# Patient Record
Sex: MALE | Race: OTHER
[De-identification: names, ages, dates, MRNs, and addresses within clinical notes are randomized per-mention and may not be internally consistent; named-entity substitution may affect disease eponyms.]

---

## 2018-12-03 LAB
25(OH)D3 SERPL-MCNC: 20.8 NG/ML
ALBUMIN SERPL ELPH-MCNC: 4.8 G/DL
ALP BLD-CCNC: 77 U/L
ALT SERPL-CCNC: 16 U/L
ANION GAP SERPL CALC-SCNC: 12 MMOL/L
APPEARANCE: CLEAR
AST SERPL-CCNC: 12 U/L
BACTERIA: NEGATIVE
BASOPHILS # BLD AUTO: 0.06 K/UL
BASOPHILS NFR BLD AUTO: 1.4 %
BILIRUB SERPL-MCNC: 0.9 MG/DL
BILIRUBIN URINE: NEGATIVE
BLOOD URINE: NEGATIVE
BUN SERPL-MCNC: 17 MG/DL
CALCIUM SERPL-MCNC: 9.8 MG/DL
CHLORIDE SERPL-SCNC: 104 MMOL/L
CHOLEST SERPL-MCNC: 200 MG/DL
CHOLEST/HDLC SERPL: 3 RATIO
CO2 SERPL-SCNC: 27 MMOL/L
COLOR: YELLOW
CREAT SERPL-MCNC: 0.95 MG/DL
EOSINOPHIL # BLD AUTO: 0.5 K/UL
EOSINOPHIL NFR BLD AUTO: 11.5 %
GLUCOSE QUALITATIVE U: NEGATIVE MG/DL
GLUCOSE SERPL-MCNC: 96 MG/DL
HCT VFR BLD CALC: 43 %
HDLC SERPL-MCNC: 66 MG/DL
HGB BLD-MCNC: 14 G/DL
IMM GRANULOCYTES NFR BLD AUTO: 0.2 %
KETONES URINE: NEGATIVE
LDLC SERPL CALC-MCNC: 124 MG/DL
LEUKOCYTE ESTERASE URINE: NEGATIVE
LYMPHOCYTES # BLD AUTO: 1.89 K/UL
LYMPHOCYTES NFR BLD AUTO: 43.3 %
MAN DIFF?: NORMAL
MCHC RBC-ENTMCNC: 30 PG
MCHC RBC-ENTMCNC: 32.6 GM/DL
MCV RBC AUTO: 92.3 FL
MICROSCOPIC-UA: NORMAL
MONOCYTES # BLD AUTO: 0.27 K/UL
MONOCYTES NFR BLD AUTO: 6.2 %
NEUTROPHILS # BLD AUTO: 1.63 K/UL
NEUTROPHILS NFR BLD AUTO: 37.4 %
NITRITE URINE: NEGATIVE
PH URINE: 6.5
PLATELET # BLD AUTO: 228 K/UL
POTASSIUM SERPL-SCNC: 4.5 MMOL/L
PROT SERPL-MCNC: 7.1 G/DL
PROTEIN URINE: NEGATIVE MG/DL
RBC # BLD: 4.66 M/UL
RBC # FLD: 12.2 %
RED BLOOD CELLS URINE: 0 /HPF
SODIUM SERPL-SCNC: 143 MMOL/L
SPECIFIC GRAVITY URINE: 1.01
SQUAMOUS EPITHELIAL CELLS: 0 /HPF
TRIGL SERPL-MCNC: 52 MG/DL
TSH SERPL-ACNC: 2.63 UIU/ML
UROBILINOGEN URINE: NEGATIVE MG/DL
WBC # FLD AUTO: 4.36 K/UL
WHITE BLOOD CELLS URINE: 0 /HPF

## 2018-12-04 ENCOUNTER — APPOINTMENT (OUTPATIENT)
Dept: FAMILY MEDICINE | Facility: CLINIC | Age: 23
End: 2018-12-04
Payer: MEDICAID

## 2018-12-04 VITALS
DIASTOLIC BLOOD PRESSURE: 55 MMHG | HEART RATE: 63 BPM | WEIGHT: 158 LBS | SYSTOLIC BLOOD PRESSURE: 132 MMHG | HEIGHT: 67 IN | OXYGEN SATURATION: 98 % | BODY MASS INDEX: 24.8 KG/M2 | TEMPERATURE: 97.9 F

## 2018-12-04 DIAGNOSIS — Z83.3 FAMILY HISTORY OF DIABETES MELLITUS: ICD-10-CM

## 2018-12-04 DIAGNOSIS — Z78.9 OTHER SPECIFIED HEALTH STATUS: ICD-10-CM

## 2018-12-04 PROCEDURE — G0008: CPT

## 2018-12-04 PROCEDURE — 90686 IIV4 VACC NO PRSV 0.5 ML IM: CPT

## 2018-12-04 PROCEDURE — 99385 PREV VISIT NEW AGE 18-39: CPT | Mod: 25

## 2018-12-04 NOTE — HEALTH RISK ASSESSMENT
[Good] : ~his/her~  mood as  good [No falls in past year] : Patient reported no falls in the past year [0] : 2) Feeling down, depressed, or hopeless: Not at all (0) [HIV test declined] : HIV test declined [Hepatitis C test declined] : Hepatitis C test declined [None] : None [With Family] : lives with family [# of Members in Household ___] :  household currently consist of [unfilled] member(s) [Employed] : employed [College] : College [Single] : single [# Of Children ___] : has [unfilled] children [Sexually Active] : sexually active [Feels Safe at Home] : Feels safe at home [Fully functional (bathing, dressing, toileting, transferring, walking, feeding)] : Fully functional (bathing, dressing, toileting, transferring, walking, feeding) [Fully functional (using the telephone, shopping, preparing meals, housekeeping, doing laundry, using] : Fully functional and needs no help or supervision to perform IADLs (using the telephone, shopping, preparing meals, housekeeping, doing laundry, using transportation, managing medications and managing finances) [Smoke Detector] : smoke detector [Carbon Monoxide Detector] : carbon monoxide detector [Seat Belt] :  uses seat belt [Sunscreen] : uses sunscreen [] : No [[id number]] : 0 [Change in mental status noted] : No change in mental status noted [Language] : denies difficulty with language [Behavior] : denies difficulty with behavior [Learning/Retaining New Information] : denies difficulty learning/retaining new information [Handling Complex Tasks] : denies difficulty handling complex tasks [Reasoning] : denies difficulty with reasoning [Spatial Ability and Orientation] : denies difficulty with spatial ability and orientation [High Risk Behavior] : no high risk behavior [Reports changes in hearing] : Reports no changes in hearing [Reports changes in vision] : Reports no changes in vision [Reports changes in dental health] : Reports no changes in dental health [Guns at Home] : no guns at home [Travel to Developing Areas] : does not  travel to developing areas [TB Exposure] : is not being exposed to tuberculosis [Caregiver Concerns] : does not have caregiver concerns [de-identified] : 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 [de-identified] : [employer]

## 2018-12-04 NOTE — HISTORY OF PRESENT ILLNESS
[FreeTextEntry1] : 'I need to get my physical exam " [de-identified] : This is a 23-year-old male with no significant past medical history presenting to the office to establish as a new patient in our practice and requested to have a complete physical exam. Patient offered no acute complaints at this time.\par In preparation to physical exam patient was given a prescription to get blood work done prior to visit.

## 2018-12-04 NOTE — ASSESSMENT
[FreeTextEntry1] : This is a 23-year-old male with no significant past medical history presenting to the office to establish as a new patient in our practice and requesting to have a complete physical exam. Patient has not had pediatrician or primary care physician since 2015..\par \par Health care maintenance:\par -Physical exam is entirely normal.\par -Patient received flu vaccine today.\par -Blood results were reviewed along with the patient.\par -Recommended to increase his vitamin D dietary intake as his vitamin D levels were low.\par -Diet and exercise were discussed with the patient.\par -Return to the office as needed.

## 2018-12-11 ENCOUNTER — APPOINTMENT (OUTPATIENT)
Dept: FAMILY MEDICINE | Facility: CLINIC | Age: 23
End: 2018-12-11

## 2020-02-12 ENCOUNTER — FORM ENCOUNTER (OUTPATIENT)
Age: 25
End: 2020-02-12

## 2020-02-13 ENCOUNTER — APPOINTMENT (OUTPATIENT)
Dept: FAMILY MEDICINE | Facility: CLINIC | Age: 25
End: 2020-02-13
Payer: COMMERCIAL

## 2020-02-13 ENCOUNTER — OUTPATIENT (OUTPATIENT)
Dept: OUTPATIENT SERVICES | Facility: HOSPITAL | Age: 25
LOS: 1 days | End: 2020-02-13
Payer: COMMERCIAL

## 2020-02-13 ENCOUNTER — APPOINTMENT (OUTPATIENT)
Dept: RADIOLOGY | Facility: CLINIC | Age: 25
End: 2020-02-13
Payer: COMMERCIAL

## 2020-02-13 VITALS
HEIGHT: 67 IN | DIASTOLIC BLOOD PRESSURE: 71 MMHG | BODY MASS INDEX: 26.68 KG/M2 | HEART RATE: 66 BPM | WEIGHT: 170 LBS | OXYGEN SATURATION: 96 % | TEMPERATURE: 98 F | SYSTOLIC BLOOD PRESSURE: 123 MMHG

## 2020-02-13 DIAGNOSIS — M25.311 OTHER INSTABILITY, RIGHT SHOULDER: ICD-10-CM

## 2020-02-13 PROCEDURE — 99395 PREV VISIT EST AGE 18-39: CPT | Mod: 25

## 2020-02-13 PROCEDURE — 90471 IMMUNIZATION ADMIN: CPT

## 2020-02-13 PROCEDURE — 73030 X-RAY EXAM OF SHOULDER: CPT

## 2020-02-13 PROCEDURE — 90686 IIV4 VACC NO PRSV 0.5 ML IM: CPT

## 2020-02-13 PROCEDURE — 73030 X-RAY EXAM OF SHOULDER: CPT | Mod: 26,RT

## 2020-02-13 NOTE — REVIEW OF SYSTEMS
[Negative] : Neurological [FreeTextEntry9] : "Able to crack Left shoulder and right hip for no reason"

## 2020-02-13 NOTE — HISTORY OF PRESENT ILLNESS
[FreeTextEntry1] : physical exam [de-identified] : This is a 24-year-old male with no significant past medical history presenting to the office to have a complete physical exam. Patient offered no acute complaints at this time. He has joined the [employer], stationed in [employer] [employer] [facility] [place].\par

## 2020-02-13 NOTE — ASSESSMENT
[FreeTextEntry1] : This is a 23-year-old male with no significant past medical history presenting to the office requesting to have a complete physical exam. \par \par Health care maintenance:\par -Physical exam is entirely normal.\par -Patient received flu vaccine today.\par -Blood work at outside facility since he is not fasting today.\par -Diet and exercise were discussed with the patient.\par -Return to the office as needed.\par -HIV Consented

## 2020-02-13 NOTE — HEALTH RISK ASSESSMENT
[Good] : ~his/her~  mood as  good [No falls in past year] : Patient reported no falls in the past year [No] : No [0] : 2) Feeling down, depressed, or hopeless: Not at all (0) [HIV Test offered] : HIV Test offered [Hepatitis C test declined] : Hepatitis C test declined [With Family] : lives with family [None] : None [# of Members in Household ___] :  household currently consist of [unfilled] member(s) [College] : College [Employed] : employed [# Of Children ___] : has [unfilled] children [Single] : single [Sexually Active] : sexually active [Fully functional (bathing, dressing, toileting, transferring, walking, feeding)] : Fully functional (bathing, dressing, toileting, transferring, walking, feeding) [Feels Safe at Home] : Feels safe at home [Fully functional (using the telephone, shopping, preparing meals, housekeeping, doing laundry, using] : Fully functional and needs no help or supervision to perform IADLs (using the telephone, shopping, preparing meals, housekeeping, doing laundry, using transportation, managing medications and managing finances) [Smoke Detector] : smoke detector [Seat Belt] :  uses seat belt [Carbon Monoxide Detector] : carbon monoxide detector [Sunscreen] : uses sunscreen [Patient/Caregiver not ready to engage] : Patient/Caregiver not ready to engage [[id number]] : 0 [] : No [Change in mental status noted] : No change in mental status noted [Learning/Retaining New Information] : denies difficulty learning/retaining new information [Behavior] : denies difficulty with behavior [Language] : denies difficulty with language [Handling Complex Tasks] : denies difficulty handling complex tasks [Spatial Ability and Orientation] : denies difficulty with spatial ability and orientation [Reasoning] : denies difficulty with reasoning [High Risk Behavior] : no high risk behavior [Reports changes in hearing] : Reports no changes in hearing [Reports changes in vision] : Reports no changes in vision [Travel to Developing Areas] : does not  travel to developing areas [Guns at Home] : no guns at home [Reports changes in dental health] : Reports no changes in dental health [TB Exposure] : is not being exposed to tuberculosis [Caregiver Concerns] : does not have caregiver concerns [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

## 2020-02-13 NOTE — PHYSICAL EXAM
[No Acute Distress] : no acute distress [Well Nourished] : well nourished [Well Developed] : well developed [Well-Appearing] : well-appearing [Normal Sclera/Conjunctiva] : normal sclera/conjunctiva [PERRL] : pupils equal round and reactive to light [EOMI] : extraocular movements intact [Normal Outer Ear/Nose] : the outer ears and nose were normal in appearance [Normal Oropharynx] : the oropharynx was normal [No JVD] : no jugular venous distention [No Lymphadenopathy] : no lymphadenopathy [Supple] : supple [Thyroid Normal, No Nodules] : the thyroid was normal and there were no nodules present [No Respiratory Distress] : no respiratory distress  [No Accessory Muscle Use] : no accessory muscle use [Clear to Auscultation] : lungs were clear to auscultation bilaterally [Normal Rate] : normal rate  [Regular Rhythm] : with a regular rhythm [Normal S1, S2] : normal S1 and S2 [No Murmur] : no murmur heard [No Carotid Bruits] : no carotid bruits [No Abdominal Bruit] : a ~M bruit was not heard ~T in the abdomen [No Varicosities] : no varicosities [Pedal Pulses Present] : the pedal pulses are present [No Edema] : there was no peripheral edema [No Palpable Aorta] : no palpable aorta [No Extremity Clubbing/Cyanosis] : no extremity clubbing/cyanosis [Soft] : abdomen soft [Non-distended] : non-distended [Non Tender] : non-tender [No Masses] : no abdominal mass palpated [No HSM] : no HSM [Normal Bowel Sounds] : normal bowel sounds [Normal Posterior Cervical Nodes] : no posterior cervical lymphadenopathy [Normal Anterior Cervical Nodes] : no anterior cervical lymphadenopathy [No CVA Tenderness] : no CVA  tenderness [No Joint Swelling] : no joint swelling [No Spinal Tenderness] : no spinal tenderness [Grossly Normal Strength/Tone] : grossly normal strength/tone [No Rash] : no rash [Coordination Grossly Intact] : coordination grossly intact [No Focal Deficits] : no focal deficits [Normal Gait] : normal gait [Deep Tendon Reflexes (DTR)] : deep tendon reflexes were 2+ and symmetric [Normal Affect] : the affect was normal [Normal Insight/Judgement] : insight and judgment were intact

## 2020-02-13 NOTE — COUNSELING
[Behavioral health counseling provided] : Behavioral health counseling provided [Engage in a relaxing activity] : Engage in a relaxing activity [AUDIT-C Screening administered and reviewed] : AUDIT-C Screening administered and reviewed [None] : None [Good understanding] : Patient has a good understanding of lifestyle changes and steps needed to achieve self management goal

## 2020-02-18 LAB
25(OH)D3 SERPL-MCNC: 17.6 NG/ML
ALBUMIN SERPL ELPH-MCNC: 4.6 G/DL
ALP BLD-CCNC: 62 U/L
ALT SERPL-CCNC: 19 U/L
ANION GAP SERPL CALC-SCNC: 13 MMOL/L
APPEARANCE: CLEAR
AST SERPL-CCNC: 22 U/L
BACTERIA: NEGATIVE
BASOPHILS # BLD AUTO: 0.05 K/UL
BASOPHILS NFR BLD AUTO: 0.9 %
BILIRUB SERPL-MCNC: 0.8 MG/DL
BILIRUBIN URINE: NEGATIVE
BLOOD URINE: NEGATIVE
BUN SERPL-MCNC: 25 MG/DL
CALCIUM SERPL-MCNC: 9.5 MG/DL
CHLORIDE SERPL-SCNC: 104 MMOL/L
CHOLEST SERPL-MCNC: 159 MG/DL
CHOLEST/HDLC SERPL: 3.4 RATIO
CO2 SERPL-SCNC: 24 MMOL/L
COLOR: NORMAL
CREAT SERPL-MCNC: 1.14 MG/DL
EOSINOPHIL # BLD AUTO: 0.17 K/UL
EOSINOPHIL NFR BLD AUTO: 2.9 %
GLUCOSE QUALITATIVE U: NEGATIVE
GLUCOSE SERPL-MCNC: 100 MG/DL
HCT VFR BLD CALC: 44.9 %
HDLC SERPL-MCNC: 47 MG/DL
HGB BLD-MCNC: 14.5 G/DL
HIV1+2 AB SPEC QL IA.RAPID: NONREACTIVE
HYALINE CASTS: 0 /LPF
IMM GRANULOCYTES NFR BLD AUTO: 0.2 %
KETONES URINE: NEGATIVE
LDLC SERPL CALC-MCNC: 94 MG/DL
LEUKOCYTE ESTERASE URINE: NEGATIVE
LYMPHOCYTES # BLD AUTO: 1.94 K/UL
LYMPHOCYTES NFR BLD AUTO: 33.5 %
MAN DIFF?: NORMAL
MCHC RBC-ENTMCNC: 30.5 PG
MCHC RBC-ENTMCNC: 32.3 GM/DL
MCV RBC AUTO: 94.5 FL
MICROSCOPIC-UA: NORMAL
MONOCYTES # BLD AUTO: 0.49 K/UL
MONOCYTES NFR BLD AUTO: 8.5 %
NEUTROPHILS # BLD AUTO: 3.13 K/UL
NEUTROPHILS NFR BLD AUTO: 54 %
NITRITE URINE: NEGATIVE
PH URINE: 6
PLATELET # BLD AUTO: 191 K/UL
POTASSIUM SERPL-SCNC: 4.4 MMOL/L
PROT SERPL-MCNC: 6.9 G/DL
PROTEIN URINE: NORMAL
RBC # BLD: 4.75 M/UL
RBC # FLD: 11.2 %
RED BLOOD CELLS URINE: 4 /HPF
SODIUM SERPL-SCNC: 141 MMOL/L
SPECIFIC GRAVITY URINE: 1.03
SQUAMOUS EPITHELIAL CELLS: 1 /HPF
TRIGL SERPL-MCNC: 88 MG/DL
TSH SERPL-ACNC: 2.65 UIU/ML
UROBILINOGEN URINE: NORMAL
WBC # FLD AUTO: 5.79 K/UL
WHITE BLOOD CELLS URINE: 0 /HPF

## 2020-05-26 ENCOUNTER — APPOINTMENT (OUTPATIENT)
Dept: FAMILY MEDICINE | Facility: CLINIC | Age: 25
End: 2020-05-26

## 2020-07-31 ENCOUNTER — EMERGENCY (EMERGENCY)
Facility: HOSPITAL | Age: 25
LOS: 1 days | Discharge: HOME | End: 2020-07-31
Attending: EMERGENCY MEDICINE
Payer: COMMERCIAL

## 2020-07-31 VITALS
OXYGEN SATURATION: 99 % | HEIGHT: 68 IN | HEART RATE: 66 BPM | WEIGHT: 169.98 LBS | DIASTOLIC BLOOD PRESSURE: 68 MMHG | RESPIRATION RATE: 18 BRPM | TEMPERATURE: 98 F | SYSTOLIC BLOOD PRESSURE: 137 MMHG

## 2020-07-31 PROCEDURE — 99283 EMERGENCY DEPT VISIT LOW MDM: CPT

## 2020-07-31 PROCEDURE — 73562 X-RAY EXAM OF KNEE 3: CPT

## 2020-07-31 PROCEDURE — 73562 X-RAY EXAM OF KNEE 3: CPT | Mod: 26,LT

## 2020-07-31 NOTE — ED PROVIDER NOTE - CARE PROVIDER_API CALL
Oliver Covarrubias  ORTHOPAEDIC SURGERY  [address] [place] [address]  Phone: [phone]  Fax: [phone]  Follow Up Time:

## 2020-07-31 NOTE — ED ADULT TRIAGE NOTE - CHIEF COMPLAINT QUOTE
Patient arrived to ED today with c/o left knee pain after playing soccer and twisting it 2 days ago.

## 2020-07-31 NOTE — ED PROVIDER NOTE - ATTENDING CONTRIBUTION TO CARE
23 yo male  c/o pain in left knee, s/p twisting while playing soccer x 2 days ago  no deformity  NV intact, 2+ pulses  swelling, and ttp medial knee  no ligamental laxity    xray  follow up

## 2020-07-31 NOTE — ED PROVIDER NOTE - PATIENT PORTAL LINK FT
You can access the FollowMyHealth Patient Portal offered by [facility] by registering at the following website: [email or web address]. By joining [employer]’s FollowMyHealth portal, you will also be able to view your health information using other applications (apps) compatible with our system.

## 2020-07-31 NOTE — ED PROVIDER NOTE - OBJECTIVE STATEMENT
25 y/o M c/o pain in left knee which started 2 days ago.  He states that he was playing soccer when he twisted his left knee, and fell, landing on the left knee.  Denies head trauma, neck pain, back pain or any other injuries.  Patient is ambulating with crutches.

## 2020-07-31 NOTE — ED ADULT NURSE NOTE - OBJECTIVE STATEMENT
A&Ox3, resp wnl, twisted left knee while playing soccer A&Ox3, resp wnl, twisted left knee while playing soccer,+ bruising to inner knee area, pt using crutches

## 2020-08-03 ENCOUNTER — APPOINTMENT (OUTPATIENT)
Age: 25
End: 2020-08-03
Payer: COMMERCIAL

## 2020-08-03 VITALS
BODY MASS INDEX: 26.68 KG/M2 | WEIGHT: 170 LBS | SYSTOLIC BLOOD PRESSURE: 117 MMHG | HEIGHT: 67 IN | DIASTOLIC BLOOD PRESSURE: 68 MMHG | HEART RATE: 70 BPM

## 2020-08-03 VITALS — TEMPERATURE: 98.4 F

## 2020-08-03 PROCEDURE — 99203 OFFICE O/P NEW LOW 30 MIN: CPT

## 2020-08-03 NOTE — DISCUSSION/SUMMARY
[de-identified] : Patient is 24 year old male who suffered a left knee injury playing soccer last week. His physical exam and pain are suggestive of an MCL injury however further ligamentous injury in the knee is possible. I am recommending he get an MRI of the left knee to further evaluate the soft tissue injury. He was placed in a short Lance brace today for medial to lateral stability and it was unlocked in flexion/extension. He was also given a prescription for physical therapy. He will call the office when the MRI is complete to discuss the results. Further treatment plan will be contingent on the results of the MRI. He was given opportunity to answer questions and acknowledged understanding of our discussion. \par \[facility] I, Panchito Rabago DO, have acted as a scribe and documented the above information for Dr. TURNER.\par \[facility] Orthopaedic Trauma Surgeon Addendum:\par \par I agree with the above resident physician note.  \par Chart was reviewed and patient examined in the orthopedic office. I agree with the assessment and plan of the resident.\par \par I was physically present for the key portions of the evaluation and management service provided. I agree with the above history, physical and plan. Appropriate imaging has been reviewed and the plan adjusted as needed.\par \par Oliver Turner MD\par Orthopaedic Trauma Surgeon\par [facility]\par [facility] [place]

## 2020-08-03 NOTE — HISTORY OF PRESENT ILLNESS
[Stable] : stable [___ days] : [unfilled] day(s) ago [3] : a current pain level of 3/10 [4] : an average pain level of 4/10 [0] : a minimum pain level of 0/10 [8] : a maximum pain level of 8/10 [Bending] : worsened by bending [Standing] : worsened by standing [Weight Bearing] : worsened by weight bearing [Ice] : relieved by ice [Rest] : relieved by rest [de-identified] : Patient is 24 year old male who had twisting injury Wednesday 7/29/23020 playing soccer. He works as a . He has had pain and swelling of the left knee since the injury and difficulty bearing weight. He was seen in the emergency department on Friday 7/31/2020 and had radiographs which were normal. He has been icing and elevating his leg since the injury with some relief. He has not taken medication for the pain. He is here for consultation. No other injuries. No pertinent medical or surgical history.  [de-identified] : sharp

## 2020-08-03 NOTE — PHYSICAL EXAM
[de-identified] : General: Patient is awake and alert and in no acute distress . oriented to person, place, and time. \par Skin: no rash, no induration and not torturous. \par Eyes: normal conjunctiva, normal eyelids and pupils were equal and round. \par ENT: normal ears, normal nose and normal gums.  \par Cardiovascular: positive peripheral pulses, brisk capillary refill, but no peripheral edema. \par Pulses were 2+ for bilateral dorsalis pedis and bilateral posterior tibial \par Respiratory: normal respiratory effort. \par Neurological: sensory intact in bilateral upper and lower extremities. \par \par \par \par Left Lower Extremity:\par +Effusion/Swelling of left knee, no skin lesions/abrasions\par Patient apprehensive on physical exam to passive range of motion. Able to Extend to 0 and flex to 100 degrees passively. Tenderness to palpation over medial knee, superficial MCL. No TTP laterally. Negative Anterior Drawer, Negative Posterior Drawer although patient was guarding during exam making true ligamentous examination difficult and not fully representative of true pathology\par \par Gait: Not bearing weight secondary to pain and crutches\par Sensation intact to light touch L2-S1\par 5/5 +EHL/FHL/TA/GS\par +DP/PT, brisk capillary refill in toes \par \par  [de-identified] : Radiographs from the emergency department reveal no fractures or dislocations, + Left Knee Effusion.

## 2020-08-03 NOTE — CONSULT LETTER
[Courtesy Letter:] : I had the pleasure of seeing your patient, [unfilled], in my office today. [Please see my note below.] : Please see my note below. [DrNino  ___] : Dr. APONTE

## 2020-08-08 ENCOUNTER — OUTPATIENT (OUTPATIENT)
Dept: OUTPATIENT SERVICES | Facility: HOSPITAL | Age: 25
LOS: 1 days | End: 2020-08-08

## 2020-08-08 ENCOUNTER — APPOINTMENT (OUTPATIENT)
Dept: MRI IMAGING | Facility: CLINIC | Age: 25
End: 2020-08-08
Payer: COMMERCIAL

## 2020-08-08 ENCOUNTER — APPOINTMENT (OUTPATIENT)
Dept: MRI IMAGING | Facility: CLINIC | Age: 25
End: 2020-08-08

## 2020-08-08 DIAGNOSIS — Z00.8 ENCOUNTER FOR OTHER GENERAL EXAMINATION: ICD-10-CM

## 2020-08-08 PROCEDURE — 73721 MRI JNT OF LWR EXTRE W/O DYE: CPT | Mod: 26,LT

## 2020-08-17 ENCOUNTER — APPOINTMENT (OUTPATIENT)
Dept: ORTHOPEDIC SURGERY | Facility: CLINIC | Age: 25
End: 2020-08-17
Payer: COMMERCIAL

## 2020-08-17 VITALS
HEART RATE: 65 BPM | BODY MASS INDEX: 25.76 KG/M2 | DIASTOLIC BLOOD PRESSURE: 72 MMHG | WEIGHT: 170 LBS | HEIGHT: 68 IN | SYSTOLIC BLOOD PRESSURE: 122 MMHG

## 2020-08-17 PROCEDURE — 99214 OFFICE O/P EST MOD 30 MIN: CPT

## 2020-08-17 RX ORDER — MELOXICAM 7.5 MG/1
7.5 TABLET ORAL
Qty: 21 | Refills: 0 | Status: COMPLETED | COMMUNITY
Start: 2020-08-17 | End: 2020-09-07

## 2020-08-17 NOTE — HISTORY OF PRESENT ILLNESS
[de-identified] : HOLLY is a 25 year old male who presents today for evaluation of left knee s/p injury 3 weeks ago while playing soccer. Her originally saw Dr Covarrubias for this issue who placed him in a brace and ordered an MRI. MRI reveals: High-grade tear of the ACL. Associated bone marrow edema/contusions in the posterior aspects of the medial and lateral tibial plateau and in the weightbearing aspect of the lateral femoral condyle. Heterogeneous increased signal and thickening of the medial collateral ligament with laxity of the medial collateral ligament, likely related to grade 2 sprain injury/partial tear.  Large knee joint effusion. Small popliteal cyst with likely rupture of the cyst. \par \par He admits to one PT session on Sunday for initial evaluation and he admits no not being able to fully extend his knee. He is not taking any medication for pain.\par

## 2020-08-17 NOTE — CONSULT LETTER
[Consult Letter:] : I had the pleasure of evaluating your patient, [unfilled]. [Please see my note below.] : Please see my note below. [Dear  ___] : Dear  [unfilled], [Sincerely,] : Sincerely, [Consult Closing:] : Thank you very much for allowing me to participate in the care of this patient.  If you have any questions, please do not hesitate to contact me. [FreeTextEntry3] : Pantera Vera DO.\par Sports Medicine \par \par Orthopaedic Surgery\par \par [facility] [place] [facility]\par \par [address] \par [address]\[place] [address]\par \par [address] [address] \[address] \[place] [address]\par \par Tel [phone]\par Fax [phone]\par \par  [FreeTextEntry2] : Chad Landis

## 2020-08-17 NOTE — DISCUSSION/SUMMARY
[de-identified] : HOLLY is a 25 year old male who presents today for evaluation of left knee s/p injury 3 weeks ago while playing soccer. Her originally saw Dr Covarrubias for this issue who placed him in a brace and ordered an MRI. MRI reveals: High-grade tear of the ACL. Associated bone marrow edema/contusions in the posterior aspects of the medial and lateral tibial plateau and in the weightbearing aspect of the lateral femoral condyle. Heterogeneous increased signal and thickening of the medial collateral ligament with laxity of the medial collateral ligament, likely related to grade 2 sprain injury/partial tear.  Large knee joint effusion. Small popliteal cyst with likely rupture of the cyst. \par \par He admits to one PT session on Sunday for initial evaluation and he admits no not being able to fully extend his knee. He is not taking any medication for pain. At this time based on his exam with swelling still apparent a script for Mobic x 21 days was sent to his pharmacy for him to take. He will continue PT 2x/week also to obtain full ROM and will work on extension exercises daily while at home.\par \par Given the nature of the injury, age and activity level of the patient, and risk for increased instability and potential for osteoarthritis, surgery is indicated. The patient understands that the common risks of an arthroscopic ACL reconstruction include infection, allergy to the anesthetic, re-rupture of the ligament and stiffness. If the range of motion does not progress properly, another arthroscopy and removal of scar may be necessary at 4-6 weeks. The patient accepts these risks. I had my surgical coordinator Milli meet with pt to go over dates to schedule this procedure. The pt understands continuation of PT until this procedure helps surgical outcomes as he is to continue doing this 2x/week until then. He agrees to the above plan and all questions were answered.

## 2020-08-17 NOTE — PHYSICAL EXAM
[de-identified] : Physical Exam:\par General: Well appearing, no acute distress\par Neurologic: A&Ox3, No focal deficits\par Head: NCAT without abrasions, lacerations, or ecchymosis to head, face, or scalp\par Eyes: No scleral icterus, no gross abnormalities\par Respiratory: Equal chest wall expansion bilaterally, no accessory muscle use\par Lymphatic: No lymphadenopathy palpated\par Skin: Warm and dry\par Psychiatric: Normal mood and affect\par \par Left knee\par \par Inspection/Palpation: Gait evaluation does reveal a limp. There is no inguinal adenopathy. Limb is well-perfused, without skin lesions, shows a grossly normal motor and sensory examination. \par ROM: The Left knee motion is significantly reduced and does cause significant pain. The knee exhibits a moderate effusion. \par Muscle/Nerves: Quad strength decreased secondary to injury. Motor exam 5/[ ]  distally, EHL/FHL/GSC/TA\par SILT L4-S1\par \par Special Tests: \par Joint line tenderness noted medial joint line at 0 and 90 degrees. \par Stable in varus and valgus stress at 0 and 30 degrees\par Negative posterior drawer. \par The knee has a 2A Lachman and positive anterior drawer.\par Unable to ellicit a pivot shift secondary to pain.  \par Normal hip and ankle exam. \par \par Right Knee:\par \par Inspection/Palpation: There is no inguinal adenopathy. Well perfused, without skin lesions, shows a grossly normal motor and sensory examination. \par ROM: Normal\par Muscle/Nerves: Quad strength decreased secondary to injury. Motor exam 5/5 distally, EHL/FHL/GSC/TA\par SILT L4-S1\par \par Special Tests: \par No Joint line tenderness noted  at medial and lateral joint line at 0 and 90 degrees. \par Stable in varus and valgus stress at 0 and 30 degrees\par Negative posterior drawer. \par Negative anterior drawer and stable Lachman \par Normal hip and ankle exam. [de-identified] : Left Knee MRI:\par \par High-grade tear of the ACL. Associated bone marrow edema/contusions in the posterior aspects of the medial and lateral tibial plateau and in the weightbearing aspect of the lateral femoral condyle. \par \par Heterogeneous increased signal and thickening of the medial collateral ligament with laxity of the medial collateral ligament, likely related to grade 2 sprain injury/partial tear. \par \par Large knee joint effusion. Small popliteal cyst with likely rupture of the cyst. \par \par

## 2020-09-17 ENCOUNTER — APPOINTMENT (OUTPATIENT)
Dept: ORTHOPEDIC SURGERY | Facility: AMBULATORY SURGERY CENTER | Age: 25
End: 2020-09-17
Payer: COMMERCIAL

## 2020-09-17 PROCEDURE — 29888 ARTHRS AID ACL RPR/AGMNTJ: CPT | Mod: LT

## 2020-09-17 PROCEDURE — 29881 ARTHRS KNE SRG MNISECTMY M/L: CPT | Mod: LT

## 2020-09-17 PROCEDURE — 20900 REMOVAL OF BONE FOR GRAFT: CPT

## 2020-09-17 RX ORDER — ASPIRIN 325 MG/1
325 TABLET ORAL
Qty: 28 | Refills: 0 | Status: COMPLETED | COMMUNITY
Start: 2020-09-17 | End: 2020-10-15

## 2020-09-17 RX ORDER — ONDANSETRON 4 MG/1
4 TABLET ORAL
Qty: 30 | Refills: 0 | Status: COMPLETED | COMMUNITY
Start: 2020-09-17 | End: 2020-09-22

## 2020-09-17 RX ORDER — OXYCODONE 5 MG/1
5 TABLET ORAL
Qty: 30 | Refills: 0 | Status: COMPLETED | COMMUNITY
Start: 2020-09-17 | End: 2020-09-24

## 2020-09-17 RX ORDER — ACETAMINOPHEN 500 MG/1
500 TABLET ORAL
Qty: 120 | Refills: 0 | Status: COMPLETED | COMMUNITY
Start: 2020-09-17 | End: 2020-10-07

## 2020-10-01 ENCOUNTER — APPOINTMENT (OUTPATIENT)
Dept: ORTHOPEDIC SURGERY | Facility: CLINIC | Age: 25
End: 2020-10-01
Payer: COMMERCIAL

## 2020-10-01 PROCEDURE — 73564 X-RAY EXAM KNEE 4 OR MORE: CPT | Mod: LT

## 2020-10-01 PROCEDURE — 99024 POSTOP FOLLOW-UP VISIT: CPT

## 2020-10-04 NOTE — HISTORY OF PRESENT ILLNESS
[___ Days Post Op] : post op day #[unfilled] [Clean/Dry/Intact] : clean, dry and intact [Healed] : healed [Swelling] : swollen [Neuro Intact] : an unremarkable neurological exam [Vascular Intact] : ~T peripheral vascular exam normal [Negative Paulina's] : maneuvers demonstrated a negative Paulina's sign [Xray (Date:___)] : [unfilled] Xray -  [Doing Well] : is doing well [Excellent Pain Control] : has excellent pain control [Chills] : no chills [Fever] : no fever [Nausea] : no nausea [Vomiting] : no vomiting [Erythema] : not erythematous [Discharge] : absent of discharge [Dehiscence] : not dehisced [de-identified] : S/P left knee ACLr BTB autograft, possible meniscus repair.  DOS: 9/17/2020. [de-identified] : HOLLY is a 25 year old male who presents today 14 days S/P left knee ACLr BTB autograft, possible meniscus repair.  He denies negative sequelae.  Patient presents in Lance brace locked in extension and NWB on crutches.  He performs PT 2x/week and HEP regularly at home. Patient took narcotic meds for the first week SPO but currently only takes ASPN.  [de-identified] : Left Knee\par \par The patient is A+Ox3, healthy appearing, and free of fever or chills. Examination reveals a well appearing surgical scar that is dry, clean and intact. There is no erythema but there is moderate effusion without warmth and mild ecchymosis throughout the leg. Knee motion is 0 - 90 degrees of passive ROM, straight leg raise with 4 degree extension lag and pain free. Weakened quad strength. Full sensation intact and dorsalis pedis pulses 2+.\par \par Straight leg raise with lag\par \par Special Tests: NEG Lachman, NEG anterior drawer, NEG posterior drawer with collateral testing and varus/valgus normal. [de-identified] : 2 views of the left knee were performed today and are available for me to review.  They were discussed with the patient and demonstrate adequate positioning of the suture buttons.  No fracture, no dislocation, no deformities.\par  [de-identified] : HOLLY is a 25 year old male who presents today 14 days S/P left knee ACLr BTB autograft, possible meniscus repair.  He denies negative sequelae.  Patient presents in Lance brace locked in extension and NWB on crutches.  He performs PT 2x/week and HEP regularly at home. Patient took narcotic meds for the first week SPO but currently only takes ASPN. \par \par Surgical sites are clean and dry without warmth or erythema, pt denies fever or chills. He is to continue PT for 4 more weeks until we see him again for re-assessment at the 2nd post-op appointment. May continue to use tylenol prn for pain. Due to lag on exam today he is to continue with her knee immobilizer in full extension with all weightbearing activities. He may unlock it up to 90 degrees with nonweightbearing activity and will progress with PT as they feel necessary. He agrees to the latter plan and does not have any further questions or concerns.

## 2020-10-07 ENCOUNTER — NON-APPOINTMENT (OUTPATIENT)
Age: 25
End: 2020-10-07

## 2020-11-02 ENCOUNTER — APPOINTMENT (OUTPATIENT)
Dept: ORTHOPEDIC SURGERY | Facility: CLINIC | Age: 25
End: 2020-11-02
Payer: COMMERCIAL

## 2020-11-02 PROCEDURE — 73560 X-RAY EXAM OF KNEE 1 OR 2: CPT | Mod: LT

## 2020-11-02 PROCEDURE — 99024 POSTOP FOLLOW-UP VISIT: CPT

## 2020-11-02 PROCEDURE — 99072 ADDL SUPL MATRL&STAF TM PHE: CPT

## 2020-12-09 ENCOUNTER — APPOINTMENT (OUTPATIENT)
Dept: ORTHOPEDIC SURGERY | Facility: CLINIC | Age: 25
End: 2020-12-09
Payer: COMMERCIAL

## 2020-12-09 PROCEDURE — 99024 POSTOP FOLLOW-UP VISIT: CPT

## 2020-12-09 NOTE — HISTORY OF PRESENT ILLNESS
[___ Weeks Post Op] : [unfilled] weeks post op [Clean/Dry/Intact] : clean, dry and intact [Healed] : healed [Swelling] : swollen [Neuro Intact] : an unremarkable neurological exam [Vascular Intact] : ~T peripheral vascular exam normal [Negative Paulina's] : maneuvers demonstrated a negative Paulina's sign [Doing Well] : is doing well [Excellent Pain Control] : has excellent pain control [Chills] : no chills [Fever] : no fever [Nausea] : no nausea [Vomiting] : no vomiting [Erythema] : not erythematous [Discharge] : absent of discharge [Dehiscence] : not dehisced [de-identified] : S/P left knee ACLr BTB autograft, possible meniscus repair.  DOS: 9/17/2020. [de-identified] : HOLLY is a 25 year old male who presents today 12 weeks S/P left knee ACLr BTB autograft, possible meniscus repair.  He denies negative sequelae. He denies knee pain at this time and is happy with his progress. He is looking to go back to work at this time and has been driving with no complaints. Patient presents today FWB without crutches or [place] brace.  He continues to perform physical therapy. He has not started running or cutting yet. He performs the bike but no elliptical.  [de-identified] : Left Knee\par \par The patient is A+Ox3, healthy appearing, and free of fever or chills. Examination reveals well appearing surgical scars that are dry, clean and intact. There is no erythema but there is no effusion without warmth and no ecchymosis throughout the leg. Knee motion is 0 - 140 degrees of passive ROM, straight leg raise without extension lag and pain free. Able to perform resisted leg raise. Mild weakness to quad strength relative to the contralateral side. Full sensation intact and dorsalis pedis pulses 2+.\par \par Special Tests: NEG Lachman, NEG anterior drawer, NEG posterior drawer with collateral testing and varus/valgus normal. [de-identified] : No imaging performed [de-identified] : HOLLY is a 25 year old male who presents today 12 weeks S/P left knee ACLr BTB autograft, possible meniscus repair.  He denies negative sequelae. He denies knee pain at this time and is happy with his progress. He is looking to go back to work at this time and has been driving with no complaints. Patient presents today FWB without crutches or [place] brace.  He continues to perform physical therapy.\par \par Surgical sites are clean and dry without warmth or erythema, pt denies fever or chills. He may continue WBAT at this time and progression with PT 2x/week alongside daily HEP. 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 He needs to work on agility training, plyometrics, and progress to running when able first. We will at this time have him switch to "[employer]" since he hasn't been getting the therapy he needs through his current location. We will see him back in 6 weeks for clinical reassessment. He agrees to the latter plan and does not have any further questions or concerns.

## 2020-12-14 NOTE — HISTORY OF PRESENT ILLNESS
[___ Weeks Post Op] : [unfilled] weeks post op [Clean/Dry/Intact] : clean, dry and intact [Healed] : healed [Swelling] : swollen [Neuro Intact] : an unremarkable neurological exam [Vascular Intact] : ~T peripheral vascular exam normal [Negative Paulina's] : maneuvers demonstrated a negative Paulina's sign [Xray (Date:___)] : [unfilled] Xray -  [Doing Well] : is doing well [Excellent Pain Control] : has excellent pain control [Chills] : no chills [Fever] : no fever [Nausea] : no nausea [Vomiting] : no vomiting [Erythema] : not erythematous [Discharge] : absent of discharge [Dehiscence] : not dehisced [de-identified] : S/P left knee ACLr BTB autograft, possible meniscus repair.  DOS: 9/17/2020. [de-identified] : HOLLY is a 25 year old male who presents today 6 weeks S/P left knee ACLr BTB autograft, possible meniscus repair.  He denies negative sequelae.  Patient presents in Lance brace locked in extension without assistive device.  He performs PT 2x/week and HEP regularly at home. Patient took narcotic meds for the first week SPO but currently denies taking medications.  The patient denies numbness/tingling to the extremity.  He denies pain.  Patient admits to muscular weakness but denies instability. [de-identified] : Left Knee\par \par The patient is A+Ox3, healthy appearing, and free of fever or chills. Examination reveals a well appearing surgical scar that is dry, clean and intact. There is no erythema but there is moderate effusion without warmth and mild ecchymosis throughout the leg. Knee motion is 0 - 120 degrees of passive ROM, straight leg raise with no degree extension lag and pain free. Weakened quad strength. Full sensation intact and dorsalis pedis pulses 2+.\par \par Straight leg raise with lag\par \par Special Tests: NEG Lachman, NEG anterior drawer, NEG posterior drawer with collateral testing and varus/valgus normal. [de-identified] : 2 views of the left knee were performed today and are available for me to review.  They were discussed with the patient and demonstrate adequate positioning of the suture buttons.  No fracture, no dislocation, no deformities.\par  [de-identified] : HOLLY is a 25 year old male who presents today 6 weeks S/P left knee ACLr BTB autograft, possible meniscus repair.  He denies negative sequelae.  Patient presents in Lance brace locked in extension and NWB on crutches.  He performs PT 2x/week and HEP regularly at home. Patient took narcotic meds for the first week SPO but currently only takes ASPN. \par \par Surgical sites are clean and dry without warmth or erythema, pt denies fever or chills. He is to continue PT for 6 more weeks until we see him again for re-assessment at the 3rd post-op appointment. May continue to use tylenol prn for pain. At this time he may wean off of his lance brace with his therapist and WBAT. He agrees to the latter plan and does not have any further questions or concerns.

## 2021-01-11 ENCOUNTER — APPOINTMENT (OUTPATIENT)
Dept: ORTHOPEDIC SURGERY | Facility: CLINIC | Age: 26
End: 2021-01-11
Payer: COMMERCIAL

## 2021-01-11 PROCEDURE — 99072 ADDL SUPL MATRL&STAF TM PHE: CPT

## 2021-01-11 PROCEDURE — 99214 OFFICE O/P EST MOD 30 MIN: CPT

## 2021-01-11 RX ORDER — NAPROXEN 500 MG/1
500 TABLET ORAL
Qty: 60 | Refills: 0 | Status: COMPLETED | COMMUNITY
Start: 2021-01-11 | End: 2021-02-10

## 2021-01-13 NOTE — HISTORY OF PRESENT ILLNESS
[___ mths] : [unfilled] month(s) ago [de-identified] : HOLLY is a 25 year old male who presents today 4 mo S/P left knee ACLr BTB autograft w/ partial lateral meniscectomy. He denies negative sequelae. He denies knee pain at this time and is happy with his progress. He is looking to go back to work at this time and has been driving with no complaints. He continues to perform physical therapy. Current symptoms include no chills, no fever, no nausea and no vomiting.\par \par Currently, the patient is doing well and is happy with his progress thus far.

## 2021-01-13 NOTE — REASON FOR VISIT
[Follow-Up Visit] : a follow-up visit for [Knee Injury] : knee injury [FreeTextEntry2] : 4 mo S/P L knee ACLr and partial lateral meniscectomy DOS 9/1720

## 2021-01-13 NOTE — PHYSICAL EXAM
[de-identified] : Physical Exam:\par General: Well appearing, no acute distress\par Neurologic: A&Ox3, No focal deficits\par Head: NCAT without abrasions, lacerations, or ecchymosis to head, face, or scalp\par Eyes: No scleral icterus, no gross abnormalities\par Respiratory: Equal chest wall expansion bilaterally, no accessory muscle use\par Lymphatic: No lymphadenopathy palpated\par Skin: Warm and dry\par Psychiatric: Normal mood and affect\par \par Left Knee\par \par The patient is A+Ox3, healthy appearing, and free of fever or chills. Examination reveals well appearing surgical scars that are dry, clean and intact. There is no erythema but there is no effusion without warmth and no ecchymosis throughout the leg. Knee motion is 0 - 140 degrees of passive ROM, straight leg raise without extension lag and pain free. Able to perform resisted leg raise. Mild weakness to quad strength relative to the contralateral side. Full sensation intact and dorsalis pedis pulses 2+.\par \par Special Tests: NEG Lachman, NEG anterior drawer, NEG posterior drawer with collateral testing and varus/valgus normal.

## 2021-01-13 NOTE — DISCUSSION/SUMMARY
[de-identified] : HOLLY is a 25 year old male who presents today 4 mo S/P left knee ACLr BTB autograft w/ partial lateral meniscectomy. He denies negative sequelae. He denies knee pain at this time and is happy with his progress. He is looking to go back to work at this time and has been driving with no complaints. He continues to perform physical therapy. Current symptoms include no chills, no fever, no nausea and no vomiting.\par \par Currently, the patient is doing well and is happy with his progress thus far.\par \par Surgical sites are clean and dry without warmth or erythema, pt denies fever or chills. He may continue WBAT at this time and progression with PT 2x/week alongside daily HEP. 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 He needs to work on agility training, plyometrics, and progress to running when able first. He has switched to [employer] since last visit and is happy with his progress since. We will see him back in 2 months for clinical reassessment. 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 I would like to see him back in 2 months to discuss potential advancement of job function at that time. He agrees to the latter plan and does not have any further questions or concerns.

## 2021-02-23 ENCOUNTER — APPOINTMENT (OUTPATIENT)
Dept: ORTHOPEDIC SURGERY | Facility: CLINIC | Age: 26
End: 2021-02-23
Payer: COMMERCIAL

## 2021-02-23 PROCEDURE — 99072 ADDL SUPL MATRL&STAF TM PHE: CPT

## 2021-02-23 PROCEDURE — 99214 OFFICE O/P EST MOD 30 MIN: CPT

## 2021-02-24 NOTE — PHYSICAL EXAM
[de-identified] : Physical Exam:\par General: Well appearing, no acute distress\par Neurologic: A&Ox3, No focal deficits\par Head: NCAT without abrasions, lacerations, or ecchymosis to head, face, or scalp\par Eyes: No scleral icterus, no gross abnormalities\par Respiratory: Equal chest wall expansion bilaterally, no accessory muscle use\par Lymphatic: No lymphadenopathy palpated\par Skin: Warm and dry\par Psychiatric: Normal mood and affect\par \par Left Knee\par \par The patient is A+Ox3, healthy appearing, and free of fever or chills. Examination reveals well appearing surgical scars that are dry, clean and intact. There is no erythema but there is no effusion without warmth and no ecchymosis throughout the leg. Knee motion is 0 - 140 degrees of passive ROM, straight leg raise without extension lag and pain free. Able to perform resisted leg raise. Mild weakness to quad strength relative to the contralateral side. Full sensation intact and dorsalis pedis pulses 2+.\par \par Special Tests: NEG Lachman, NEG anterior drawer, NEG posterior drawer with collateral testing and varus/valgus normal.

## 2021-02-24 NOTE — HISTORY OF PRESENT ILLNESS
[___ mths] : [unfilled] month(s) ago [de-identified] : HOLLY is a 25 year old male who presents today 6 mo S/P left knee ACLr BTB autograft w/ partial lateral meniscectomy. He denies negative sequelae. He denies knee pain at this time and is happy with his progress. He continues to perform physical therapy. Current symptoms include no chills, no fever, no nausea and no vomiting.\par \par Currently, the patient is doing well and is happy with his progress thus far. He has no other complaints at this time.

## 2021-02-24 NOTE — DISCUSSION/SUMMARY
[de-identified] : HOLLY is a 25 year old male who presents today 6 mo S/P left knee ACLr BTB autograft w/ partial lateral meniscectomy. He denies negative sequelae. He denies knee pain at this time and is happy with his progress. He continues to perform physical therapy. Current symptoms include no chills, no fever, no nausea and no vomiting.\par \par Currently, the patient is doing well and is happy with his progress thus far. He has no other complaints at this time. He at this time will continue formal PT 2x/week until we see him again alongside HEP daily. We will see him back in 2 months for clinical reassessment. I believe he is capable of returning to work full duty without restrictions starting March 1. He agrees with the above plan and all questions were answered.\par

## 2021-03-25 ENCOUNTER — APPOINTMENT (OUTPATIENT)
Dept: ORTHOPEDIC SURGERY | Facility: CLINIC | Age: 26
End: 2021-03-25

## 2021-09-10 ENCOUNTER — APPOINTMENT (OUTPATIENT)
Dept: ORTHOPEDIC SURGERY | Facility: CLINIC | Age: 26
End: 2021-09-10
Payer: COMMERCIAL

## 2021-09-10 PROCEDURE — 99213 OFFICE O/P EST LOW 20 MIN: CPT

## 2021-09-10 NOTE — HISTORY OF PRESENT ILLNESS
[de-identified] : HOLLY is a 26 year male who presents today for aftercare following sx to his left knee arthroscopy with ACL reconstruction BTB autograft, 1 year ago. Currently,

## 2021-09-10 NOTE — PHYSICAL EXAM
[de-identified] : Physical Exam:\par General: Well appearing, no acute distress\par Neurologic: A&Ox3, No focal deficits\par Head: NCAT without abrasions, lacerations, or ecchymosis to head, face, or scalp\par Eyes: No scleral icterus, no gross abnormalities\par Respiratory: Equal chest wall expansion bilaterally, no accessory muscle use\par Lymphatic: No lymphadenopathy palpated\par Skin: Warm and dry\par Psychiatric: Normal mood and affect\par \par Left Knee\par \par The patient is A+Ox3, healthy appearing, and free of fever or chills. Examination reveals well appearing surgical scars that are dry, clean and intact. There is no erythema but there is no effusion without warmth and no ecchymosis throughout the leg. Knee motion is 0 - 140 degrees of passive ROM, straight leg raise without extension lag and pain free. Able to perform resisted leg raise. Great quad strength without weakness. Full sensation intact and dorsalis pedis pulses 2+.\par \par Special Tests: NEG Lachman, NEG anterior drawer, NEG posterior drawer with collateral testing and varus/valgus normal.

## 2021-09-10 NOTE — DISCUSSION/SUMMARY
[de-identified] : HOLLY is a 25 year old male who presents today 1 year ago S/P left knee ACL reconstruction BTB autograft w/ partial lateral meniscectomy. He denies negative sequelae. He denies knee pain at this time and is happy with his progress. He continues to perform HEP and working out with a  since march, 2x/week. He stopped formal PT in March, prematurely at the 6 month roel and has not seen us for clinical evaluation since then. He did not perform pylometric or agility training or return to sports protocol with his therapist. He is back to work without complaints but reports difficulties with side to side activities/ lunges. He is very active playing soccer and working as a  in the city,\par \par Currently, the patient is doing well and is happy with his progress thus far but requires more formal PT for plyometrics and agility training. Due to his job description and activity level he also necessitates formal PT for completion of ACL return to sports criteria that was provided to him today. He will complete this packet with his therapist and return it to us at his follow up appointment. We will see him back in 2-3 months for clinical reassessment after performing more PT. He agrees with the above plan and all questions were answered.\par

## 2021-10-12 ENCOUNTER — APPOINTMENT (OUTPATIENT)
Dept: FAMILY MEDICINE | Facility: CLINIC | Age: 26
End: 2021-10-12
Payer: COMMERCIAL

## 2021-10-12 VITALS
DIASTOLIC BLOOD PRESSURE: 78 MMHG | TEMPERATURE: 98 F | OXYGEN SATURATION: 98 % | BODY MASS INDEX: 25.76 KG/M2 | HEART RATE: 72 BPM | WEIGHT: 170 LBS | HEIGHT: 68 IN | RESPIRATION RATE: 16 BRPM | SYSTOLIC BLOOD PRESSURE: 122 MMHG

## 2021-10-12 DIAGNOSIS — J30.2 OTHER SEASONAL ALLERGIC RHINITIS: ICD-10-CM

## 2021-10-12 PROCEDURE — 99212 OFFICE O/P EST SF 10 MIN: CPT

## 2021-10-12 NOTE — HEALTH RISK ASSESSMENT
[No] : In the past 12 months have you used drugs other than those required for medical reasons? No [No falls in past year] : Patient reported no falls in the past year [0] : 2) Feeling down, depressed, or hopeless: Not at all (0) [] : No [MFU1[place]] : 0

## 2021-10-12 NOTE — HISTORY OF PRESENT ILLNESS
[de-identified] : This is a 26-year-old male with no significant past medical history presenting to the office c/o dry cough that began productive of yellowish sputum x 1 week.  Pt has been taking OTC medication with no real relief. Denies any sick contacts at home or at work, he is fully vaccinated against Covid. Pt denies any fever, loss of taste or smell. [FreeTextEntry8] : This is a 26-year-old male with no significant past medical history presenting to the office c/o dry cough that began productive of yellowish sputum x 1 week.  Pt has been taking OTC medication with no real relief. Denies any sick contacts at home or at work, he is fully vaccinated against Covid. Pt denies any fever, loss of taste or smell.

## 2021-10-12 NOTE — PHYSICAL EXAM
[Normal] : no carotid or abdominal bruits heard, no varicosities, pedal pulses are present, no peripheral edema, no extremity clubbing or cyanosis and no palpable aorta [de-identified] : Boggy and pale nares, posterior pharynx pink and moist, no tonsillar enlargement.

## 2021-10-12 NOTE — ASSESSMENT
[FreeTextEntry1] : This is a 26-year-old male with no significant past medical history presenting to the office c/o dry cough and runny nose\par \par ENT: Seasonal allergies\par -Azelastine NS, Tessalon 200 mg caps q8h prn\par \par Health care maintenance:\par -Patient declines flu vaccine today.\par -Diet and exercise were discussed with the patient.\par -Return to the office as needed.\par -HIV non reactive\par -RTO for CPE at earliest convenience.

## 2021-12-06 ENCOUNTER — APPOINTMENT (OUTPATIENT)
Dept: ORTHOPEDIC SURGERY | Facility: CLINIC | Age: 26
End: 2021-12-06

## 2021-12-21 ENCOUNTER — APPOINTMENT (OUTPATIENT)
Dept: FAMILY MEDICINE | Facility: CLINIC | Age: 26
End: 2021-12-21
Payer: COMMERCIAL

## 2021-12-21 VITALS
SYSTOLIC BLOOD PRESSURE: 130 MMHG | RESPIRATION RATE: 16 BRPM | HEIGHT: 68 IN | TEMPERATURE: 97.2 F | HEART RATE: 80 BPM | DIASTOLIC BLOOD PRESSURE: 78 MMHG | OXYGEN SATURATION: 98 % | WEIGHT: 180 LBS | BODY MASS INDEX: 27.28 KG/M2

## 2021-12-21 PROCEDURE — 99395 PREV VISIT EST AGE 18-39: CPT | Mod: 25

## 2021-12-21 NOTE — HISTORY OF PRESENT ILLNESS
[FreeTextEntry8] : This is a 26-year-old male with no significant past medical history presenting to the office for CPE. Pt states that he tested + for Covid on 12/10 /21 after having been exposed to it (some  in his unit), then developed cough and loss of sense of smell. [FreeTextEntry1] : CPE [de-identified] : This is a 26-year-old male with no significant past medical history presenting to the office for CPE. Pt states that he tested + for Covid on 12/10 /21 after having been exposed to it (some  in his unit), then developed cough and loss of sense of smell.

## 2021-12-21 NOTE — COUNSELING
[Behavioral health counseling provided] : Behavioral health counseling provided [Engage in a relaxing activity] : Engage in a relaxing activity [AUDIT-C Screening administered and reviewed] : AUDIT-C Screening administered and reviewed [None] : None [Good understanding] : Patient has a good understanding of lifestyle changes and steps needed to achieve self management goal [Fall prevention counseling provided] : Fall prevention counseling provided [Adequate lighting] : Adequate lighting [No throw rugs] : No throw rugs [Use proper foot wear] : Use proper foot wear [Sleep ___ hours/day] : Sleep [unfilled] hours/day [Benefits of weight loss discussed] : Benefits of weight loss discussed [Encouraged to increase physical activity] : Encouraged to increase physical activity [____ min/wk Activity] : [unfilled] min/wk activity

## 2021-12-21 NOTE — ASSESSMENT
[FreeTextEntry1] : This is a 26-year-old male with PMHx of recent Covid 19 infection uncomplicated presents for CPE.\par \par ENT: Seasonal allergies\par -Continue Azelastine NS, Tessalon 200 mg caps q8h prn\par \par Health care maintenance:\par -Patient declines flu vaccine.\par -Covid vaccine MODERNA 1/27/21, 2/25/21\par -Diet and exercise were discussed with the patient.\par -Return to the office as needed.\par -HIV / Hep C non reactive\par

## 2021-12-21 NOTE — HEALTH RISK ASSESSMENT
[Good] : ~his/her~  mood as  good [No] : In the past 12 months have you used drugs other than those required for medical reasons? No [No falls in past year] : Patient reported no falls in the past year [0] : 2) Feeling down, depressed, or hopeless: Not at all (0) [Hepatitis C test declined] : Hepatitis C test declined [None] : None [With Family] : lives with family [# of Members in Household ___] :  household currently consist of [unfilled] member(s) [Employed] : employed [College] : College [Single] : single [# Of Children ___] : has [unfilled] children [Sexually Active] : sexually active [Feels Safe at Home] : Feels safe at home [Fully functional (bathing, dressing, toileting, transferring, walking, feeding)] : Fully functional (bathing, dressing, toileting, transferring, walking, feeding) [Fully functional (using the telephone, shopping, preparing meals, housekeeping, doing laundry, using] : Fully functional and needs no help or supervision to perform IADLs (using the telephone, shopping, preparing meals, housekeeping, doing laundry, using transportation, managing medications and managing finances) [Smoke Detector] : smoke detector [Carbon Monoxide Detector] : carbon monoxide detector [Seat Belt] :  uses seat belt [Sunscreen] : uses sunscreen [Former] : Former [Monthly or less (1 pt)] : Monthly or less (1 point) [PHQ-2 Negative - No further assessment needed] : PHQ-2 Negative - No further assessment needed [HIV test declined] : HIV test declined [Patient/Caregiver not ready to engage] : , patient/caregiver not ready to engage [Reviewed no changes] : Reviewed, no changes [Audit-CScore] : 1 [de-identified] : soccer, GYM [de-identified] : regular [[id number]] : 0 [Change in mental status noted] : No change in mental status noted [Language] : denies difficulty with language [Behavior] : denies difficulty with behavior [Learning/Retaining New Information] : denies difficulty learning/retaining new information [Handling Complex Tasks] : denies difficulty handling complex tasks [Reasoning] : denies difficulty with reasoning [Spatial Ability and Orientation] : denies difficulty with spatial ability and orientation [High Risk Behavior] : no high risk behavior [Reports changes in hearing] : Reports no changes in hearing [Reports changes in vision] : Reports no changes in vision [Reports changes in dental health] : Reports no changes in dental health [Guns at Home] : no guns at home [Travel to Developing Areas] : does not  travel to developing areas [TB Exposure] : is not being exposed to tuberculosis [Caregiver Concerns] : does not have caregiver concerns [HIVDate] : 2/20 [de-identified] : full time [FreeTextEntry2] :  [AdvancecareDate] : 12/21

## 2021-12-29 LAB
25(OH)D3 SERPL-MCNC: 31.3 NG/ML
ALBUMIN SERPL ELPH-MCNC: 5 G/DL
ALP BLD-CCNC: 75 U/L
ALT SERPL-CCNC: 60 U/L
ANION GAP SERPL CALC-SCNC: 14 MMOL/L
APPEARANCE: CLEAR
AST SERPL-CCNC: 30 U/L
BACTERIA: NEGATIVE
BASOPHILS # BLD AUTO: 0.07 K/UL
BASOPHILS NFR BLD AUTO: 0.7 %
BILIRUB SERPL-MCNC: 1 MG/DL
BILIRUBIN URINE: NEGATIVE
BLOOD URINE: NEGATIVE
BUN SERPL-MCNC: 12 MG/DL
CALCIUM SERPL-MCNC: 10.3 MG/DL
CHLORIDE SERPL-SCNC: 98 MMOL/L
CHOLEST SERPL-MCNC: 255 MG/DL
CO2 SERPL-SCNC: 28 MMOL/L
COLOR: YELLOW
CREAT SERPL-MCNC: 0.97 MG/DL
EOSINOPHIL # BLD AUTO: 0.17 K/UL
EOSINOPHIL NFR BLD AUTO: 1.7 %
GLUCOSE QUALITATIVE U: NEGATIVE
GLUCOSE SERPL-MCNC: 76 MG/DL
HCT VFR BLD CALC: 45.1 %
HDLC SERPL-MCNC: 48 MG/DL
HGB BLD-MCNC: 14.7 G/DL
HYALINE CASTS: 0 /LPF
IMM GRANULOCYTES NFR BLD AUTO: 1.7 %
KETONES URINE: NEGATIVE
LDLC SERPL CALC-MCNC: NORMAL MG/DL
LEUKOCYTE ESTERASE URINE: NEGATIVE
LYMPHOCYTES # BLD AUTO: 2.2 K/UL
LYMPHOCYTES NFR BLD AUTO: 22.5 %
MAN DIFF?: NORMAL
MCHC RBC-ENTMCNC: 30.4 PG
MCHC RBC-ENTMCNC: 32.6 GM/DL
MCV RBC AUTO: 93.2 FL
MICROSCOPIC-UA: NORMAL
MONOCYTES # BLD AUTO: 0.58 K/UL
MONOCYTES NFR BLD AUTO: 5.9 %
NEUTROPHILS # BLD AUTO: 6.59 K/UL
NEUTROPHILS NFR BLD AUTO: 67.5 %
NITRITE URINE: NEGATIVE
NONHDLC SERPL-MCNC: 206 MG/DL
PH URINE: 8
PLATELET # BLD AUTO: 259 K/UL
POTASSIUM SERPL-SCNC: 4.8 MMOL/L
PROT SERPL-MCNC: 7.8 G/DL
PROTEIN URINE: NORMAL
RBC # BLD: 4.84 M/UL
RBC # FLD: 11.1 %
RED BLOOD CELLS URINE: 0 /HPF
SODIUM SERPL-SCNC: 140 MMOL/L
SPECIFIC GRAVITY URINE: 1.02
SQUAMOUS EPITHELIAL CELLS: 1 /HPF
TRIGL SERPL-MCNC: 458 MG/DL
TSH SERPL-ACNC: 1.95 UIU/ML
UROBILINOGEN URINE: NORMAL
WBC # FLD AUTO: 9.78 K/UL
WHITE BLOOD CELLS URINE: 0 /HPF

## 2022-01-03 ENCOUNTER — NON-APPOINTMENT (OUTPATIENT)
Age: 27
End: 2022-01-03

## 2022-01-19 ENCOUNTER — APPOINTMENT (OUTPATIENT)
Dept: FAMILY MEDICINE | Facility: CLINIC | Age: 27
End: 2022-01-19
Payer: COMMERCIAL

## 2022-01-19 VITALS
HEART RATE: 86 BPM | OXYGEN SATURATION: 98 % | RESPIRATION RATE: 16 BRPM | WEIGHT: 173 LBS | BODY MASS INDEX: 26.22 KG/M2 | HEIGHT: 68 IN | TEMPERATURE: 98.3 F | SYSTOLIC BLOOD PRESSURE: 124 MMHG | DIASTOLIC BLOOD PRESSURE: 78 MMHG

## 2022-01-19 DIAGNOSIS — E78.2 MIXED HYPERLIPIDEMIA: ICD-10-CM

## 2022-01-19 PROCEDURE — 99212 OFFICE O/P EST SF 10 MIN: CPT | Mod: 25

## 2022-01-28 NOTE — HEALTH RISK ASSESSMENT
[Former] : Former [Monthly or less (1 pt)] : Monthly or less (1 point) [No] : In the past 12 months have you used drugs other than those required for medical reasons? No [No falls in past year] : Patient reported no falls in the past year [0] : 2) Feeling down, depressed, or hopeless: Not at all (0) [PHQ-2 Negative - No further assessment needed] : PHQ-2 Negative - No further assessment needed [Audit-CScore] : 1 [de-identified] : soccer, GYM [de-identified] : regular [BEQ9Clfnk] : 0 [With Patient/Caregiver] : , with patient/caregiver [Reviewed no changes] : Reviewed, no changes [AdvancecareDate] : 01/22

## 2022-01-28 NOTE — HISTORY OF PRESENT ILLNESS
[FreeTextEntry1] : blood work [de-identified] : This is a 26-year-old male with PMHx of HLD, uncomplicated Covid infection presenting to the office for follow up on lipid panel. Pt states that he has improved his diet, offers no complains

## 2022-01-28 NOTE — ASSESSMENT
[FreeTextEntry1] : This is a 26-year-old male with PMHx of recent Covid 19 infection uncomplicated, HLD presents for lipids check.\par \par CVS: HLD\par -Check fasting lipids today in house\par -Diet / exercise discussed.\par \par Health care maintenance:\par -Patient declines flu vaccine.\par -Covid vaccine MODERNA 1/27/21, 2/25/21\par -Diet and exercise were discussed with the patient.\par -Return to the office as needed.\par -HIV / Hep C non reactive\par

## 2022-02-04 LAB
CHOLEST SERPL-MCNC: 257 MG/DL
HDLC SERPL-MCNC: 65 MG/DL
LDLC SERPL CALC-MCNC: 180 MG/DL
NONHDLC SERPL-MCNC: 193 MG/DL
TRIGL SERPL-MCNC: 64 MG/DL

## 2023-04-13 ENCOUNTER — APPOINTMENT (OUTPATIENT)
Dept: FAMILY MEDICINE | Facility: CLINIC | Age: 28
End: 2023-04-13
Payer: COMMERCIAL

## 2023-04-13 VITALS
OXYGEN SATURATION: 98 % | WEIGHT: 180 LBS | SYSTOLIC BLOOD PRESSURE: 110 MMHG | DIASTOLIC BLOOD PRESSURE: 72 MMHG | HEART RATE: 62 BPM | HEIGHT: 68 IN | RESPIRATION RATE: 12 BRPM | BODY MASS INDEX: 27.28 KG/M2

## 2023-04-13 DIAGNOSIS — S83.512A SPRAIN OF ANTERIOR CRUCIATE LIGAMENT OF LEFT KNEE, INITIAL ENCOUNTER: ICD-10-CM

## 2023-04-13 DIAGNOSIS — Z92.29 PERSONAL HISTORY OF OTHER DRUG THERAPY: ICD-10-CM

## 2023-04-13 DIAGNOSIS — M54.9 DORSALGIA, UNSPECIFIED: ICD-10-CM

## 2023-04-13 DIAGNOSIS — M25.311 OTHER INSTABILITY, RIGHT SHOULDER: ICD-10-CM

## 2023-04-13 DIAGNOSIS — Z00.00 ENCOUNTER FOR GENERAL ADULT MEDICAL EXAMINATION W/OUT ABNORMAL FINDINGS: ICD-10-CM

## 2023-04-13 DIAGNOSIS — S83.412A SPRAIN OF MEDIAL COLLATERAL LIGAMENT OF LEFT KNEE, INITIAL ENCOUNTER: ICD-10-CM

## 2023-04-13 DIAGNOSIS — Z98.890 OTHER SPECIFIED POSTPROCEDURAL STATES: ICD-10-CM

## 2023-04-13 DIAGNOSIS — S89.92XA UNSPECIFIED INJURY OF LEFT LOWER LEG, INITIAL ENCOUNTER: ICD-10-CM

## 2023-04-13 PROCEDURE — 36415 COLL VENOUS BLD VENIPUNCTURE: CPT

## 2023-04-13 PROCEDURE — 99395 PREV VISIT EST AGE 18-39: CPT | Mod: 25

## 2023-04-13 PROCEDURE — G0442 ANNUAL ALCOHOL SCREEN 15 MIN: CPT

## 2023-04-13 RX ORDER — NAPROXEN 500 MG/1
500 TABLET ORAL
Qty: 28 | Refills: 0 | Status: COMPLETED | COMMUNITY
Start: 2020-11-02 | End: 2023-04-13

## 2023-04-13 RX ORDER — BENZONATATE 200 MG/1
200 CAPSULE ORAL 3 TIMES DAILY
Qty: 30 | Refills: 1 | Status: COMPLETED | COMMUNITY
Start: 2021-10-12 | End: 2023-04-13

## 2023-04-13 RX ORDER — AZELASTINE HYDROCHLORIDE 137 UG/1
137 SPRAY, METERED NASAL
Qty: 3 | Refills: 1 | Status: COMPLETED | COMMUNITY
Start: 2021-10-12 | End: 2023-04-13

## 2023-04-13 NOTE — HISTORY OF PRESENT ILLNESS
[FreeTextEntry1] : CPE [de-identified] : This is a 26-year-old male with PMHx of HLD, uncomplicated Covid infection presenting to the office for follow up on lipid panel. Pt states that he has improved his diet, offers no complains

## 2023-04-13 NOTE — ASSESSMENT
[FreeTextEntry1] : This is a 27-year-old male with PMHx of Covid 19 infection, HLD presents for CPE\par \par CVS: HLD\par -Check fasting lipids today in house\par -Diet / exercise discussed.\par \par ENT: Decreased sense of smell\par -Veramyst NS e-prescribed.\par \par Health care maintenance:\par -Patient declines flu vaccine.\par -Covid vaccine MODERNA 1/27/21, 2/25/21\par -Diet and exercise were discussed with the patient.\par -Return to the office as needed.\par -HIV / Hep C non reactive\par

## 2023-04-13 NOTE — PHYSICAL EXAM
[No Acute Distress] : no acute distress [Well Nourished] : well nourished [Well Developed] : well developed [Well-Appearing] : well-appearing [Normal Sclera/Conjunctiva] : normal sclera/conjunctiva [PERRL] : pupils equal round and reactive to light [EOMI] : extraocular movements intact [Normal Outer Ear/Nose] : the outer ears and nose were normal in appearance [Normal Oropharynx] : the oropharynx was normal [No JVD] : no jugular venous distention [No Lymphadenopathy] : no lymphadenopathy [Supple] : supple [Thyroid Normal, No Nodules] : the thyroid was normal and there were no nodules present [No Respiratory Distress] : no respiratory distress  [No Accessory Muscle Use] : no accessory muscle use [Clear to Auscultation] : lungs were clear to auscultation bilaterally [Normal Rate] : normal rate  [Regular Rhythm] : with a regular rhythm [Normal S1, S2] : normal S1 and S2 [No Murmur] : no murmur heard [No Carotid Bruits] : no carotid bruits [No Abdominal Bruit] : a ~M bruit was not heard ~T in the abdomen [No Varicosities] : no varicosities [Pedal Pulses Present] : the pedal pulses are present [No Edema] : there was no peripheral edema [No Palpable Aorta] : no palpable aorta [No Extremity Clubbing/Cyanosis] : no extremity clubbing/cyanosis [Soft] : abdomen soft [Non Tender] : non-tender [Non-distended] : non-distended [No Masses] : no abdominal mass palpated [No HSM] : no HSM [Normal Bowel Sounds] : normal bowel sounds [Normal Posterior Cervical Nodes] : no posterior cervical lymphadenopathy [Normal Anterior Cervical Nodes] : no anterior cervical lymphadenopathy [No CVA Tenderness] : no CVA  tenderness [No Spinal Tenderness] : no spinal tenderness [No Joint Swelling] : no joint swelling [Grossly Normal Strength/Tone] : grossly normal strength/tone [No Rash] : no rash [Coordination Grossly Intact] : coordination grossly intact [No Focal Deficits] : no focal deficits [Normal Gait] : normal gait [Deep Tendon Reflexes (DTR)] : deep tendon reflexes were 2+ and symmetric [Normal Affect] : the affect was normal [Normal Insight/Judgement] : insight and judgment were intact [de-identified] : decreased sense of smell since Covid

## 2023-04-13 NOTE — HEALTH RISK ASSESSMENT
[Good] : ~his/her~  mood as  good [Monthly or less (1 pt)] : Monthly or less (1 point) [No] : In the past 12 months have you used drugs other than those required for medical reasons? No [No falls in past year] : Patient reported no falls in the past year [0] : 2) Feeling down, depressed, or hopeless: Not at all (0) [PHQ-2 Negative - No further assessment needed] : PHQ-2 Negative - No further assessment needed [HIV test declined] : HIV test declined [Hepatitis C test declined] : Hepatitis C test declined [None] : None [With Family] : lives with family [# of Members in Household ___] :  household currently consist of [unfilled] member(s) [Employed] : employed [College] : College [Single] : single [# Of Children ___] : has [unfilled] children [Sexually Active] : sexually active [Feels Safe at Home] : Feels safe at home [Fully functional (bathing, dressing, toileting, transferring, walking, feeding)] : Fully functional (bathing, dressing, toileting, transferring, walking, feeding) [Fully functional (using the telephone, shopping, preparing meals, housekeeping, doing laundry, using] : Fully functional and needs no help or supervision to perform IADLs (using the telephone, shopping, preparing meals, housekeeping, doing laundry, using transportation, managing medications and managing finances) [Smoke Detector] : smoke detector [Carbon Monoxide Detector] : carbon monoxide detector [Seat Belt] :  uses seat belt [Sunscreen] : uses sunscreen [With Patient/Caregiver] : , with patient/caregiver [Yes] : Yes [Reviewed updated] : Reviewed, updated [Aggressive treatment] : aggressive treatment [Never] : Never [Audit-CScore] : 1 [de-identified] : soccer, GYM [de-identified] : regular [[id number]] : 0 [Change in mental status noted] : No change in mental status noted [Language] : denies difficulty with language [Behavior] : denies difficulty with behavior [Learning/Retaining New Information] : denies difficulty learning/retaining new information [Handling Complex Tasks] : denies difficulty handling complex tasks [Reasoning] : denies difficulty with reasoning [Spatial Ability and Orientation] : denies difficulty with spatial ability and orientation [High Risk Behavior] : no high risk behavior [Reports changes in hearing] : Reports no changes in hearing [Reports changes in vision] : Reports no changes in vision [Reports changes in dental health] : Reports no changes in dental health [Guns at Home] : no guns at home [Travel to Developing Areas] : does not  travel to developing areas [TB Exposure] : is not being exposed to tuberculosis [Caregiver Concerns] : does not have caregiver concerns [HIVDate] : 2/20 [de-identified] : full time [FreeTextEntry2] : [place]  [AdvancecareDate] : 04/23

## 2023-04-21 LAB
ALBUMIN SERPL ELPH-MCNC: 4.8 G/DL
ALP BLD-CCNC: 66 U/L
ALT SERPL-CCNC: 21 U/L
ANION GAP SERPL CALC-SCNC: 12 MMOL/L
APPEARANCE: CLEAR
AST SERPL-CCNC: 25 U/L
BACTERIA: NEGATIVE
BASOPHILS # BLD AUTO: 0.07 K/UL
BASOPHILS NFR BLD AUTO: 1.8 %
BILIRUB SERPL-MCNC: 1.2 MG/DL
BILIRUBIN URINE: NEGATIVE
BLOOD URINE: NEGATIVE
BUN SERPL-MCNC: 21 MG/DL
CALCIUM SERPL-MCNC: 10 MG/DL
CHLORIDE SERPL-SCNC: 102 MMOL/L
CHOLEST SERPL-MCNC: 207 MG/DL
CO2 SERPL-SCNC: 26 MMOL/L
COLOR: YELLOW
CREAT SERPL-MCNC: 1.08 MG/DL
EGFR: 98 ML/MIN/1.73M2
EOSINOPHIL # BLD AUTO: 0.21 K/UL
EOSINOPHIL NFR BLD AUTO: 5.4 %
GLUCOSE QUALITATIVE U: NEGATIVE
GLUCOSE SERPL-MCNC: 106 MG/DL
HCT VFR BLD CALC: 45.1 %
HDLC SERPL-MCNC: 55 MG/DL
HGB BLD-MCNC: 14.5 G/DL
HYALINE CASTS: 0 /LPF
IMM GRANULOCYTES NFR BLD AUTO: 0.3 %
KETONES URINE: NEGATIVE
LDLC SERPL CALC-MCNC: 139 MG/DL
LEUKOCYTE ESTERASE URINE: NEGATIVE
LYMPHOCYTES # BLD AUTO: 2.01 K/UL
LYMPHOCYTES NFR BLD AUTO: 51.5 %
MAN DIFF?: NORMAL
MCHC RBC-ENTMCNC: 31.4 PG
MCHC RBC-ENTMCNC: 32.2 GM/DL
MCV RBC AUTO: 97.6 FL
MICROSCOPIC-UA: NORMAL
MONOCYTES # BLD AUTO: 0.24 K/UL
MONOCYTES NFR BLD AUTO: 6.2 %
NEUTROPHILS # BLD AUTO: 1.36 K/UL
NEUTROPHILS NFR BLD AUTO: 34.8 %
NITRITE URINE: NEGATIVE
NONHDLC SERPL-MCNC: 152 MG/DL
PH URINE: 6.5
PLATELET # BLD AUTO: 201 K/UL
POTASSIUM SERPL-SCNC: 5.2 MMOL/L
PROT SERPL-MCNC: 7 G/DL
PROTEIN URINE: NORMAL
RBC # BLD: 4.62 M/UL
RBC # FLD: 11.4 %
RED BLOOD CELLS URINE: 2 /HPF
SODIUM SERPL-SCNC: 140 MMOL/L
SPECIFIC GRAVITY URINE: 1.03
SQUAMOUS EPITHELIAL CELLS: 0 /HPF
TRIGL SERPL-MCNC: 63 MG/DL
TSH SERPL-ACNC: 1.72 UIU/ML
UROBILINOGEN URINE: NORMAL
WBC # FLD AUTO: 3.9 K/UL
WHITE BLOOD CELLS URINE: 0 /HPF

## 2023-04-21 RX ORDER — FLUTICASONE FUROATE 27.5 UG/1
27.5 SPRAY, METERED NASAL DAILY
Qty: 1 | Refills: 0 | Status: ACTIVE | COMMUNITY
Start: 2023-04-13 | End: 1900-01-01

## 2024-10-26 ENCOUNTER — NON-APPOINTMENT (OUTPATIENT)
Age: 29
End: 2024-10-26

## 2024-10-26 ENCOUNTER — RESULT REVIEW (OUTPATIENT)
Age: 29
End: 2024-10-26

## 2024-10-26 ENCOUNTER — TRANSCRIPTION ENCOUNTER (OUTPATIENT)
Age: 29
End: 2024-10-26

## 2024-10-30 ENCOUNTER — APPOINTMENT (OUTPATIENT)
Dept: ORTHOPEDIC SURGERY | Facility: CLINIC | Age: 29
End: 2024-10-30

## 2024-10-31 ENCOUNTER — APPOINTMENT (OUTPATIENT)
Dept: ORTHOPEDIC SURGERY | Facility: CLINIC | Age: 29
End: 2024-10-31
Payer: COMMERCIAL

## 2024-10-31 ENCOUNTER — APPOINTMENT (OUTPATIENT)
Dept: FAMILY MEDICINE | Facility: CLINIC | Age: 29
End: 2024-10-31
Payer: COMMERCIAL

## 2024-10-31 VITALS
HEART RATE: 70 BPM | SYSTOLIC BLOOD PRESSURE: 118 MMHG | TEMPERATURE: 97.2 F | DIASTOLIC BLOOD PRESSURE: 70 MMHG | BODY MASS INDEX: 27.7 KG/M2 | OXYGEN SATURATION: 98 % | RESPIRATION RATE: 14 BRPM | WEIGHT: 187 LBS | HEIGHT: 69 IN

## 2024-10-31 DIAGNOSIS — S83.92XA SPRAIN OF UNSPECIFIED SITE OF LEFT KNEE, INITIAL ENCOUNTER: ICD-10-CM

## 2024-10-31 DIAGNOSIS — R43.8 OTHER DISTURBANCES OF SMELL AND TASTE: ICD-10-CM

## 2024-10-31 DIAGNOSIS — Z00.00 ENCOUNTER FOR GENERAL ADULT MEDICAL EXAMINATION W/OUT ABNORMAL FINDINGS: ICD-10-CM

## 2024-10-31 PROCEDURE — 99395 PREV VISIT EST AGE 18-39: CPT

## 2024-10-31 PROCEDURE — 99204 OFFICE O/P NEW MOD 45 MIN: CPT

## 2024-10-31 PROCEDURE — 36415 COLL VENOUS BLD VENIPUNCTURE: CPT

## 2024-11-04 LAB
ALBUMIN SERPL ELPH-MCNC: 4.9 G/DL
ALP BLD-CCNC: 53 U/L
ALT SERPL-CCNC: 104 U/L
ANION GAP SERPL CALC-SCNC: 9 MMOL/L
AST SERPL-CCNC: 39 U/L
BILIRUB SERPL-MCNC: 1.6 MG/DL
BUN SERPL-MCNC: 17 MG/DL
CALCIUM SERPL-MCNC: 9.9 MG/DL
CHLORIDE SERPL-SCNC: 102 MMOL/L
CHOLEST SERPL-MCNC: 254 MG/DL
CO2 SERPL-SCNC: 29 MMOL/L
CREAT SERPL-MCNC: 0.98 MG/DL
EGFR: 108 ML/MIN/1.73M2
ESTIMATED AVERAGE GLUCOSE: 88 MG/DL
GLUCOSE SERPL-MCNC: 107 MG/DL
HBA1C MFR BLD HPLC: 4.7 %
HCT VFR BLD CALC: 49 %
HDLC SERPL-MCNC: 54 MG/DL
HGB BLD-MCNC: 15.2 G/DL
LDLC SERPL CALC-MCNC: 171 MG/DL
MCHC RBC-ENTMCNC: 30 PG
MCHC RBC-ENTMCNC: 31 G/DL
MCV RBC AUTO: 96.6 FL
NONHDLC SERPL-MCNC: 200 MG/DL
PLATELET # BLD AUTO: 233 K/UL
POTASSIUM SERPL-SCNC: 5.1 MMOL/L
PROT SERPL-MCNC: 7.3 G/DL
RBC # BLD: 5.07 M/UL
RBC # FLD: 11.4 %
SODIUM SERPL-SCNC: 140 MMOL/L
TRIGL SERPL-MCNC: 158 MG/DL
TSH SERPL-ACNC: 2.45 UIU/ML
WBC # FLD AUTO: 4.24 K/UL

## 2024-11-12 ENCOUNTER — APPOINTMENT (OUTPATIENT)
Dept: MRI IMAGING | Facility: CLINIC | Age: 29
End: 2024-11-12
Payer: COMMERCIAL

## 2024-11-12 ENCOUNTER — OUTPATIENT (OUTPATIENT)
Dept: OUTPATIENT SERVICES | Facility: HOSPITAL | Age: 29
LOS: 1 days | End: 2024-11-12

## 2024-11-12 DIAGNOSIS — S83.92XA SPRAIN OF UNSPECIFIED SITE OF LEFT KNEE, INITIAL ENCOUNTER: ICD-10-CM

## 2024-11-12 PROCEDURE — 73721 MRI JNT OF LWR EXTRE W/O DYE: CPT | Mod: 26,LT

## 2024-11-18 ENCOUNTER — APPOINTMENT (OUTPATIENT)
Dept: ORTHOPEDIC SURGERY | Facility: CLINIC | Age: 29
End: 2024-11-18
Payer: COMMERCIAL

## 2024-11-18 VITALS — WEIGHT: 187 LBS | HEIGHT: 69 IN | BODY MASS INDEX: 27.7 KG/M2

## 2024-11-18 DIAGNOSIS — S83.282D OTHER TEAR OF LATERAL MENISCUS, CURRENT INJURY, LEFT KNEE, SUBSEQUENT ENCOUNTER: ICD-10-CM

## 2024-11-18 DIAGNOSIS — S89.92XD UNSPECIFIED INJURY OF LEFT LOWER LEG, SUBSEQUENT ENCOUNTER: ICD-10-CM

## 2024-11-18 PROCEDURE — 99214 OFFICE O/P EST MOD 30 MIN: CPT
